# Patient Record
Sex: FEMALE | Race: WHITE | HISPANIC OR LATINO | Employment: UNEMPLOYED | URBAN - METROPOLITAN AREA
[De-identification: names, ages, dates, MRNs, and addresses within clinical notes are randomized per-mention and may not be internally consistent; named-entity substitution may affect disease eponyms.]

---

## 2024-07-23 PROBLEM — R55 SYNCOPE AND COLLAPSE: Status: ACTIVE | Noted: 2024-07-23

## 2024-07-23 PROBLEM — R94.31 ABNORMAL EKG: Status: ACTIVE | Noted: 2024-07-23

## 2024-07-23 PROBLEM — R06.09 DOE (DYSPNEA ON EXERTION): Status: ACTIVE | Noted: 2024-07-23

## 2024-07-29 ENCOUNTER — HOSPITAL ENCOUNTER (OUTPATIENT)
Dept: RADIOLOGY | Facility: HOSPITAL | Age: 39
Discharge: HOME | End: 2024-07-29
Attending: NURSE PRACTITIONER

## 2024-07-29 DIAGNOSIS — R10.2 PELVIC PAIN: ICD-10-CM

## 2024-07-29 PROCEDURE — 76856 US EXAM PELVIC COMPLETE: CPT | Mod: TC

## 2024-08-06 ENCOUNTER — HOSPITAL ENCOUNTER (OUTPATIENT)
Dept: CARDIOLOGY | Facility: HOSPITAL | Age: 39
Discharge: HOME | End: 2024-08-06

## 2024-08-06 VITALS
BODY MASS INDEX: 24.55 KG/M2 | WEIGHT: 152.75 LBS | HEIGHT: 66 IN | HEART RATE: 67 BPM | SYSTOLIC BLOOD PRESSURE: 132 MMHG | RESPIRATION RATE: 20 BRPM | DIASTOLIC BLOOD PRESSURE: 85 MMHG

## 2024-08-06 DIAGNOSIS — R00.2 PALPITATIONS: ICD-10-CM

## 2024-08-06 DIAGNOSIS — R07.89 CHEST DISCOMFORT: ICD-10-CM

## 2024-08-06 DIAGNOSIS — R06.09 DOE (DYSPNEA ON EXERTION): ICD-10-CM

## 2024-08-06 DIAGNOSIS — R94.31 ABNORMAL EKG: ICD-10-CM

## 2024-08-06 LAB
CV STRESS BASE HR: 66 BPM
DIASTOLIC BLOOD PRESSURE: 85 MMHG
OHS CV CPX 85 PERCENT MAX PREDICTED HEART RATE MALE: 154
OHS CV CPX ESTIMATED METS: 10
OHS CV CPX MAX PREDICTED HEART RATE: 181
OHS CV CPX PATIENT IS FEMALE: 1
OHS CV CPX PATIENT IS MALE: 0
OHS CV CPX PEAK DIASTOLIC BLOOD PRESSURE: 89 MMHG
OHS CV CPX PEAK HEAR RATE: 155 BPM
OHS CV CPX PEAK RATE PRESSURE PRODUCT: NORMAL
OHS CV CPX PEAK SYSTOLIC BLOOD PRESSURE: 168 MMHG
OHS CV CPX PERCENT MAX PREDICTED HEART RATE ACHIEVED: 90
OHS CV CPX RATE PRESSURE PRODUCT PRESENTING: 8712
OHS QRS DURATION: 100 MS
OHS QTC CALCULATION: 386 MS
STRESS ECHO POST EXERCISE DUR MIN: 8 MINUTES
STRESS ECHO POST EXERCISE DUR SEC: 4 SECONDS
SYSTOLIC BLOOD PRESSURE: 132 MMHG

## 2024-08-06 PROCEDURE — 93017 CV STRESS TEST TRACING ONLY: CPT

## 2024-08-14 ENCOUNTER — TELEPHONE (OUTPATIENT)
Dept: CARDIOLOGY | Facility: CLINIC | Age: 39
End: 2024-08-14

## 2024-08-14 NOTE — TELEPHONE ENCOUNTER
Left voicemail for patient regarding results of stress test.  Advised patient to notify clinic if she has any further questions or concerns, but made her aware that we will go over the details of the testing at her next clinic visit.    Sydni Calles PA-C

## 2024-10-29 ENCOUNTER — HOSPITAL ENCOUNTER (OUTPATIENT)
Dept: CARDIOLOGY | Facility: HOSPITAL | Age: 39
Discharge: HOME | End: 2024-10-29

## 2024-10-29 VITALS
DIASTOLIC BLOOD PRESSURE: 79 MMHG | SYSTOLIC BLOOD PRESSURE: 125 MMHG | WEIGHT: 152 LBS | BODY MASS INDEX: 24.43 KG/M2 | HEIGHT: 66 IN

## 2024-10-29 DIAGNOSIS — R06.09 DOE (DYSPNEA ON EXERTION): ICD-10-CM

## 2024-10-29 DIAGNOSIS — R00.2 PALPITATIONS: ICD-10-CM

## 2024-10-29 DIAGNOSIS — R94.31 ABNORMAL EKG: ICD-10-CM

## 2024-10-29 LAB
APICAL FOUR CHAMBER EJECTION FRACTION: 53 %
APICAL TWO CHAMBER EJECTION FRACTION: 54 %
AV INDEX (PROSTH): 0.7
AV MEAN GRADIENT: 3.8 MMHG
AV PEAK GRADIENT: 6.8 MMHG
AV VALVE AREA BY VELOCITY RATIO: 2.4 CM²
AV VALVE AREA: 2.2 CM²
AV VELOCITY RATIO: 0.77
BSA FOR ECHO PROCEDURE: 1.79 M2
CV ECHO LV RWT: 0.38 CM
DOP CALC AO PEAK VEL: 1.3 M/S
DOP CALC AO VTI: 28.9 CM
DOP CALC LVOT AREA: 3.1 CM2
DOP CALC LVOT DIAMETER: 2 CM
DOP CALC LVOT PEAK VEL: 1 M/S
DOP CALC LVOT STROKE VOLUME: 63.4 CM3
DOP CALC MV VTI: 31.6 CM
DOP CALCLVOT PEAK VEL VTI: 20.2 CM
E WAVE DECELERATION TIME: 254.69 MSEC
E/A RATIO: 1.81
E/E' RATIO: 5.58 M/S
ECHO LV POSTERIOR WALL: 0.9 CM (ref 0.6–1.1)
FRACTIONAL SHORTENING: 29.8 % (ref 28–44)
HR MV ECHO: 62 BPM
INTERVENTRICULAR SEPTUM: 0.8 CM (ref 0.6–1.1)
LEFT ATRIUM SIZE: 3.06 CM
LEFT INTERNAL DIMENSION IN SYSTOLE: 3.3 CM (ref 2.1–4)
LEFT VENTRICLE DIASTOLIC VOLUME INDEX: 58.69 ML/M2
LEFT VENTRICLE DIASTOLIC VOLUME: 104.46 ML
LEFT VENTRICLE END DIASTOLIC VOLUME APICAL 2 CHAMBER: 71.98 ML
LEFT VENTRICLE END DIASTOLIC VOLUME APICAL 4 CHAMBER: 91.3 ML
LEFT VENTRICLE MASS INDEX: 74.3 G/M2
LEFT VENTRICLE SYSTOLIC VOLUME INDEX: 24.8 ML/M2
LEFT VENTRICLE SYSTOLIC VOLUME: 44.06 ML
LEFT VENTRICULAR INTERNAL DIMENSION IN DIASTOLE: 4.7 CM (ref 3.5–6)
LEFT VENTRICULAR MASS: 132.3 G
LV LATERAL E/E' RATIO: 4.79 M/S
LV SEPTAL E/E' RATIO: 6.7 M/S
LVED V (TEICH): 104.46 ML
LVES V (TEICH): 44.06 ML
LVOT MG: 1.91 MMHG
LVOT MV: 0.63 CM/S
MV MEAN GRADIENT: 1 MMHG
MV PEAK A VEL: 0.37 M/S
MV PEAK E VEL: 0.67 M/S
MV PEAK GRADIENT: 3 MMHG
MV STENOSIS PRESSURE HALF TIME: 73.86 MS
MV VALVE AREA BY CONTINUITY EQUATION: 2.01 CM2
MV VALVE AREA P 1/2 METHOD: 2.98 CM2
OHS LV EJECTION FRACTION SIMPSONS BIPLANE MOD: 54 %
PISA TR MAX VEL: 1.52 M/S
RA PRESSURE ESTIMATED: 3 MMHG
RIGHT VENTRICULAR END-DIASTOLIC DIMENSION: 2.84 CM
RV TB RVSP: 5 MMHG
TDI LATERAL: 0.14 M/S
TDI SEPTAL: 0.1 M/S
TDI: 0.12 M/S
TR MAX PG: 9 MMHG
TRICUSPID ANNULAR PLANE SYSTOLIC EXCURSION: 2.4 CM
TV REST PULMONARY ARTERY PRESSURE: 12 MMHG
Z-SCORE OF LEFT VENTRICULAR DIMENSION IN END DIASTOLE: -0.46
Z-SCORE OF LEFT VENTRICULAR DIMENSION IN END SYSTOLE: 0.64

## 2024-10-29 PROCEDURE — 93306 TTE W/DOPPLER COMPLETE: CPT

## 2024-11-29 ENCOUNTER — HOSPITAL ENCOUNTER (EMERGENCY)
Facility: HOSPITAL | Age: 39
Discharge: HOME | End: 2024-11-29
Attending: INTERNAL MEDICINE

## 2024-11-29 VITALS
BODY MASS INDEX: 24.94 KG/M2 | SYSTOLIC BLOOD PRESSURE: 133 MMHG | OXYGEN SATURATION: 100 % | HEART RATE: 59 BPM | WEIGHT: 158.94 LBS | RESPIRATION RATE: 16 BRPM | HEIGHT: 67 IN | DIASTOLIC BLOOD PRESSURE: 79 MMHG | TEMPERATURE: 98 F

## 2024-11-29 DIAGNOSIS — R51.9 RIGHT-SIDED HEADACHE: Primary | ICD-10-CM

## 2024-11-29 LAB
ALBUMIN SERPL-MCNC: 4.6 G/DL (ref 3.5–5)
ALBUMIN/GLOB SERPL: 1.4 RATIO (ref 1.1–2)
ALP SERPL-CCNC: 65 UNIT/L (ref 40–150)
ALT SERPL-CCNC: 13 UNIT/L (ref 0–55)
ANION GAP SERPL CALC-SCNC: 8 MEQ/L
AST SERPL-CCNC: 22 UNIT/L (ref 5–34)
B-HCG UR QL: NEGATIVE
BASOPHILS # BLD AUTO: 0.02 X10(3)/MCL
BASOPHILS NFR BLD AUTO: 0.2 %
BILIRUB SERPL-MCNC: 0.9 MG/DL
BUN SERPL-MCNC: 12.2 MG/DL (ref 7–18.7)
CALCIUM SERPL-MCNC: 9.5 MG/DL (ref 8.4–10.2)
CHLORIDE SERPL-SCNC: 107 MMOL/L (ref 98–107)
CO2 SERPL-SCNC: 24 MMOL/L (ref 22–29)
CREAT SERPL-MCNC: 0.79 MG/DL (ref 0.55–1.02)
CREAT/UREA NIT SERPL: 15
CTP QC/QA: YES
EOSINOPHIL # BLD AUTO: 0.16 X10(3)/MCL (ref 0–0.9)
EOSINOPHIL NFR BLD AUTO: 1.8 %
ERYTHROCYTE [DISTWIDTH] IN BLOOD BY AUTOMATED COUNT: 11.3 % (ref 11.5–17)
GFR SERPLBLD CREATININE-BSD FMLA CKD-EPI: >60 ML/MIN/1.73/M2
GLOBULIN SER-MCNC: 3.4 GM/DL (ref 2.4–3.5)
GLUCOSE SERPL-MCNC: 86 MG/DL (ref 74–100)
HCT VFR BLD AUTO: 36.9 % (ref 37–47)
HGB BLD-MCNC: 12.7 G/DL (ref 12–16)
IMM GRANULOCYTES # BLD AUTO: 0.02 X10(3)/MCL (ref 0–0.04)
IMM GRANULOCYTES NFR BLD AUTO: 0.2 %
LYMPHOCYTES # BLD AUTO: 2.99 X10(3)/MCL (ref 0.6–4.6)
LYMPHOCYTES NFR BLD AUTO: 33.5 %
MCH RBC QN AUTO: 31.9 PG (ref 27–31)
MCHC RBC AUTO-ENTMCNC: 34.4 G/DL (ref 33–36)
MCV RBC AUTO: 92.7 FL (ref 80–94)
MONOCYTES # BLD AUTO: 0.54 X10(3)/MCL (ref 0.1–1.3)
MONOCYTES NFR BLD AUTO: 6 %
NEUTROPHILS # BLD AUTO: 5.2 X10(3)/MCL (ref 2.1–9.2)
NEUTROPHILS NFR BLD AUTO: 58.3 %
NRBC BLD AUTO-RTO: 0 %
PLATELET # BLD AUTO: 188 X10(3)/MCL (ref 130–400)
PMV BLD AUTO: 9.9 FL (ref 7.4–10.4)
POTASSIUM SERPL-SCNC: 3.6 MMOL/L (ref 3.5–5.1)
PROT SERPL-MCNC: 8 GM/DL (ref 6.4–8.3)
RBC # BLD AUTO: 3.98 X10(6)/MCL (ref 4.2–5.4)
SODIUM SERPL-SCNC: 139 MMOL/L (ref 136–145)
WBC # BLD AUTO: 8.93 X10(3)/MCL (ref 4.5–11.5)

## 2024-11-29 PROCEDURE — 96372 THER/PROPH/DIAG INJ SC/IM: CPT | Performed by: PHYSICIAN ASSISTANT

## 2024-11-29 PROCEDURE — 99284 EMERGENCY DEPT VISIT MOD MDM: CPT | Mod: 25

## 2024-11-29 PROCEDURE — 80053 COMPREHEN METABOLIC PANEL: CPT | Performed by: PHYSICIAN ASSISTANT

## 2024-11-29 PROCEDURE — 85025 COMPLETE CBC W/AUTO DIFF WBC: CPT | Performed by: PHYSICIAN ASSISTANT

## 2024-11-29 PROCEDURE — 81025 URINE PREGNANCY TEST: CPT | Performed by: PHYSICIAN ASSISTANT

## 2024-11-29 PROCEDURE — 63600175 PHARM REV CODE 636 W HCPCS: Performed by: PHYSICIAN ASSISTANT

## 2024-11-29 RX ORDER — KETOROLAC TROMETHAMINE 30 MG/ML
30 INJECTION, SOLUTION INTRAMUSCULAR; INTRAVENOUS
Status: COMPLETED | OUTPATIENT
Start: 2024-11-29 | End: 2024-11-29

## 2024-11-29 RX ADMIN — KETOROLAC TROMETHAMINE 30 MG: 30 INJECTION, SOLUTION INTRAMUSCULAR; INTRAVENOUS at 09:11

## 2024-11-30 NOTE — DISCHARGE INSTRUCTIONS
Mantente brayden hidratado.  Selene un seguimiento con yanez médico dentro de 2 a 3 días.  Regrese si los síntomas empeoran.    Stay well hydrated.  Follow up with your doctor within 2-3 days.  Return if symptoms worsen.

## 2024-11-30 NOTE — ED PROVIDER NOTES
Encounter Date: 11/29/2024       History     Chief Complaint   Patient presents with    Headache     Pt states she has been have a headache for 1 week. States he takes OTC medicine it helps for a little while but it comes back.      39-year-old Maldivian-speaking female presents to the emergency department complaints of right-sided headache for 1 week.  She rates her pain 6/10.  Denies other symptoms.    The history is provided by the patient. No  was used.     Review of patient's allergies indicates:  No Known Allergies  No past medical history on file.  No past surgical history on file.  Family History   Problem Relation Name Age of Onset    Hypertension Mother      Diabetes Mother      No Known Problems Father      Heart disease Sister      Hypertension Sister      Diabetes Sister      Uterine cancer Sister      No Known Problems Maternal Grandmother      No Known Problems Paternal Grandmother       Social History     Tobacco Use    Smoking status: Never    Smokeless tobacco: Never   Substance Use Topics    Alcohol use: Never    Drug use: Never     Review of Systems   Constitutional:  Negative for chills and fever.   Respiratory:  Negative for cough, chest tightness and shortness of breath.    Cardiovascular:  Negative for chest pain and palpitations.   Gastrointestinal:  Negative for abdominal pain, diarrhea, nausea and vomiting.   Neurological:  Positive for headaches (right). Negative for dizziness and numbness.       Physical Exam     Initial Vitals [11/29/24 1945]   BP Pulse Resp Temp SpO2   (!) 154/91 66 18 98.1 °F (36.7 °C) 100 %      MAP       --         Physical Exam    Nursing note and vitals reviewed.  Constitutional: She appears well-developed and well-nourished.   Eyes: EOM are normal. Pupils are equal, round, and reactive to light.   Cardiovascular:  Normal rate, regular rhythm, normal heart sounds and intact distal pulses.           Pulmonary/Chest: Breath sounds normal.    Abdominal: Abdomen is soft. Bowel sounds are normal. She exhibits no mass. There is no guarding.   Musculoskeletal:         General: Normal range of motion.     Neurological: She is alert and oriented to person, place, and time. No cranial nerve deficit. GCS score is 15. GCS eye subscore is 4. GCS verbal subscore is 5. GCS motor subscore is 6.   Skin: Skin is warm. Capillary refill takes less than 2 seconds.         ED Course   Procedures  Labs Reviewed   CBC WITH DIFFERENTIAL - Abnormal       Result Value    WBC 8.93      RBC 3.98 (*)     Hgb 12.7      Hct 36.9 (*)     MCV 92.7      MCH 31.9 (*)     MCHC 34.4      RDW 11.3 (*)     Platelet 188      MPV 9.9      Neut % 58.3      Lymph % 33.5      Mono % 6.0      Eos % 1.8      Basophil % 0.2      Lymph # 2.99      Neut # 5.20      Mono # 0.54      Eos # 0.16      Baso # 0.02      IG# 0.02      IG% 0.2      NRBC% 0.0     COMPREHENSIVE METABOLIC PANEL    Sodium 139      Potassium 3.6      Chloride 107      CO2 24      Glucose 86      Blood Urea Nitrogen 12.2      Creatinine 0.79      Calcium 9.5      Protein Total 8.0      Albumin 4.6      Globulin 3.4      Albumin/Globulin Ratio 1.4      Bilirubin Total 0.9      ALP 65      ALT 13      AST 22      eGFR >60      Anion Gap 8.0      BUN/Creatinine Ratio 15     CBC W/ AUTO DIFFERENTIAL    Narrative:     The following orders were created for panel order CBC Auto Differential.  Procedure                               Abnormality         Status                     ---------                               -----------         ------                     CBC with Differential[[id number]]       Abnormal            Final result                 Please view results for these tests on the individual orders.   POCT URINE PREGNANCY    POC Preg Test, Ur Negative       Acceptable Yes            Imaging Results    None          Medications   ketorolac injection 30 mg (30 mg Intramuscular Given 11/29/24 0963)     Medical  Decision Making  39-year-old Urdu-speaking female presents to the emergency department complaints of right-sided headache for 1 week.  She rates her pain 6/10.  Denies other symptoms.    DDx: tension headache, dehydration, migraine    Normal exam, CBC and CMP unremarkable.  Toradol IM given in the ED for headache.  She will follow up with her doctor.      Amount and/or Complexity of Data Reviewed  Labs: ordered.    Risk  Prescription drug management.               ED Course as of 11/30/24 0031 Fri Nov 29, 2024 2219 The patient is resting comfortably and in no acute distress.  She states that her symptoms have improved after treatment in Emergency Department. I personally discussed her test results and treatment plan.  Gave strict ED precautions, discussed specific conditions for return to the emergency department and importance of follow up with her primary care provider.  Patient voices understanding and agrees to the plan discussed. All patients' questions have been answered at this time.   She has remained hemodynamically stable throughout entire stay in ED and is stable for discharge home.  [ER]      ED Course User Index  [ER] Sayra Camacho PA                           Clinical Impression:  Final diagnoses:  [R51.9] Right-sided headache (Primary)          ED Disposition Condition    Discharge Stable          ED Prescriptions    None       Follow-up Information       Follow up With Specialties Details Why Contact Info    [facility] - Emergency Dept Emergency Medicine  As needed, If symptoms worsen [address] [facility] [address] [phone]             Sayra Camacho PA  11/30/24 0031

## 2025-01-13 ENCOUNTER — HOSPITAL ENCOUNTER (EMERGENCY)
Facility: HOSPITAL | Age: 40
Discharge: HOME | End: 2025-01-13
Attending: EMERGENCY MEDICINE

## 2025-01-13 VITALS
HEART RATE: 62 BPM | OXYGEN SATURATION: 100 % | HEIGHT: 67 IN | TEMPERATURE: 98 F | DIASTOLIC BLOOD PRESSURE: 75 MMHG | WEIGHT: 158.5 LBS | SYSTOLIC BLOOD PRESSURE: 118 MMHG | BODY MASS INDEX: 24.88 KG/M2 | RESPIRATION RATE: 18 BRPM

## 2025-01-13 DIAGNOSIS — G43.809 OTHER MIGRAINE WITHOUT STATUS MIGRAINOSUS, NOT INTRACTABLE: Primary | ICD-10-CM

## 2025-01-13 LAB
B-HCG UR QL: NEGATIVE
CTP QC/QA: YES

## 2025-01-13 PROCEDURE — 96372 THER/PROPH/DIAG INJ SC/IM: CPT | Performed by: EMERGENCY MEDICINE

## 2025-01-13 PROCEDURE — 63600175 PHARM REV CODE 636 W HCPCS: Performed by: EMERGENCY MEDICINE

## 2025-01-13 PROCEDURE — 99284 EMERGENCY DEPT VISIT MOD MDM: CPT | Mod: 25

## 2025-01-13 PROCEDURE — 81025 URINE PREGNANCY TEST: CPT | Performed by: EMERGENCY MEDICINE

## 2025-01-13 PROCEDURE — 25000003 PHARM REV CODE 250: Performed by: EMERGENCY MEDICINE

## 2025-01-13 RX ORDER — DEXAMETHASONE SODIUM PHOSPHATE 4 MG/ML
8 INJECTION, SOLUTION INTRA-ARTICULAR; INTRALESIONAL; INTRAMUSCULAR; INTRAVENOUS; SOFT TISSUE
Status: COMPLETED | OUTPATIENT
Start: 2025-01-13 | End: 2025-01-13

## 2025-01-13 RX ORDER — BUTALBITAL, ACETAMINOPHEN AND CAFFEINE 50; 325; 40 MG/1; MG/1; MG/1
1 TABLET ORAL
Status: COMPLETED | OUTPATIENT
Start: 2025-01-13 | End: 2025-01-13

## 2025-01-13 RX ORDER — BUTALBITAL, ACETAMINOPHEN AND CAFFEINE 50; 325; 40 MG/1; MG/1; MG/1
2 TABLET ORAL 3 TIMES DAILY PRN
Qty: 30 TABLET | Refills: 3 | Status: SHIPPED | OUTPATIENT
Start: 2025-01-13

## 2025-01-13 RX ORDER — METOCLOPRAMIDE HYDROCHLORIDE 5 MG/ML
10 INJECTION INTRAMUSCULAR; INTRAVENOUS
Status: COMPLETED | OUTPATIENT
Start: 2025-01-13 | End: 2025-01-13

## 2025-01-13 RX ORDER — KETOROLAC TROMETHAMINE 30 MG/ML
30 INJECTION, SOLUTION INTRAMUSCULAR; INTRAVENOUS
Status: COMPLETED | OUTPATIENT
Start: 2025-01-13 | End: 2025-01-13

## 2025-01-13 RX ADMIN — DEXAMETHASONE SODIUM PHOSPHATE 8 MG: 4 INJECTION, SOLUTION INTRA-ARTICULAR; INTRALESIONAL; INTRAMUSCULAR; INTRAVENOUS; SOFT TISSUE at 09:01

## 2025-01-13 RX ADMIN — METOCLOPRAMIDE HYDROCHLORIDE 10 MG: 5 INJECTION INTRAMUSCULAR; INTRAVENOUS at 09:01

## 2025-01-13 RX ADMIN — BUTALBITAL, ACETAMINOPHEN, AND CAFFEINE 1 TABLET: 325; 50; 40 TABLET ORAL at 08:01

## 2025-01-13 RX ADMIN — KETOROLAC TROMETHAMINE 30 MG: 30 INJECTION, SOLUTION INTRAMUSCULAR; INTRAVENOUS at 09:01

## 2025-01-13 NOTE — ED PROVIDER NOTES
ED PROVIDER NOTE  1/13/2025    CHIEF COMPLAINT:   Chief Complaint   Patient presents with    Headache     Headache since yesterday. Denies any head injury or vommiting. Reports had an upcoming appointment with neurologist this month for migraines.        HISTORY OF PRESENT ILLNESS:   Lisa Lyon is a 39 y.o. female who presents with chief complaint Headache.  Onset was yesterday whenever she began having headache that has mild-to-moderate in severity.  States she normally would take Fioricet whenever headache would come on but states she is out of this medication.  This is NOT the worst headache of her life.  Denies any concern for pregnancy.    The history is provided by the patient. The history is limited by a language barrier. A  was used.         REVIEW OF SYSTEMS: as noted in the HPI.  NURSING NOTES REVIEWED      PAST MEDICAL/SURGICAL HISTORY: History reviewed. No pertinent past medical history. History reviewed. No pertinent surgical history.    FAMILY HISTORY:   Family History   Problem Relation Name Age of Onset    Hypertension Mother      Diabetes Mother      No Known Problems Father      Heart disease Sister      Hypertension Sister      Diabetes Sister      Uterine cancer Sister      No Known Problems Maternal Grandmother      No Known Problems Paternal Grandmother         SOCIAL HISTORY:   Social History     Tobacco Use    Smoking status: Never    Smokeless tobacco: Never   Substance Use Topics    Alcohol use: Never    Drug use: Never       ALLERGIES: Review of patient's allergies indicates:  No Known Allergies    PHYSICAL EXAM:  Initial Vitals [01/13/25 0816]   BP Pulse Resp Temp SpO2   (!) 145/84 78 18 98.3 °F (36.8 °C) 99 %      MAP       --         Physical Exam    Nursing note and vitals reviewed.  Constitutional: She appears well-developed and well-nourished.   HENT:   Head: Normocephalic and atraumatic. Mouth/Throat: Uvula is midline and mucous membranes are  normal.   Eyes: EOM are normal. Pupils are equal, round, and reactive to light.   Neck: Trachea normal. Neck supple.   Cardiovascular:  Normal rate, regular rhythm and normal pulses.           Pulmonary/Chest: Effort normal and breath sounds normal.   Abdominal: Abdomen is soft. Bowel sounds are normal. There is no abdominal tenderness. There is no rebound and no guarding.   Musculoskeletal:         General: Normal range of motion.      Cervical back: Neck supple.     Neurological: She is alert and oriented to person, place, and time. GCS eye subscore is 4. GCS verbal subscore is 5. GCS motor subscore is 6.   Skin: Skin is warm and dry.   Psychiatric: She has a normal mood and affect. Her speech is normal. Thought content normal.         RESULTS:  Labs Reviewed   POCT URINE PREGNANCY       Result Value    POC Preg Test, Ur Negative       Acceptable Yes       Imaging Results    None         PROCEDURES:  Procedures    ECG:       ED COURSE AND MEDICAL DECISION MAKING:  Medications   butalbital-acetaminophen-caffeine -40 mg per tablet 1 tablet (1 tablet Oral Given 1/13/25 0835)   ketorolac injection 30 mg (30 mg Intramuscular Given 1/13/25 0953)   metoclopramide injection 10 mg (10 mg Intramuscular Given 1/13/25 0953)   dexAMETHasone injection 8 mg (8 mg Intramuscular Given 1/13/25 0953)     ED Course as of 01/13/25 1901   Mon Jan 13, 2025   0921 Reports no significant improvement with Fioricet.  We will give migraine cocktail of IM Reglan Toradol and Decadron. [IB]   1024 hCG Qualitative, Urine: Negative [IB]   1045 Reports improvement in headache after migraine cocktail. [IB]      ED Course User Index  [IB] Rick Fermin, DO        Medical Decision Making  39-year-old female who presents with headache that began yesterday.  States headache is typical of migraines she has had in the past.  Reports improvement in the headache after migraine cocktail.  We will discharge with Fioricet.  Given strict  ED return precautions. I have spoken with the patient and/or caregivers. I have explained the patient's condition, diagnoses and treatment plan based on the information available to me at this time. I have answered the patient's and/or caregiver's questions and addressed any concerns. The patient and/or caregivers have as good an understanding of the patient's diagnosis, condition and treatment plan as can be expected at this point. The vital signs have been stable. The patient's condition is stable and appropriate for discharge from the emergency department.     The patient will pursue further outpatient evaluation with the primary care physician or other designated or consulting physician as outlined in the discharge instructions. The patient and/or caregivers are agreeable to this plan of care and follow-up instructions have been explained in detail. The patient and/or caregivers have received these instructions in written format and have expressed an understanding of the discharge instructions. The patient and/or caregivers are aware that any significant change in condition or worsening of symptoms should prompt an immediate return to this or the closest emergency department or a call to 911.    Amount and/or Complexity of Data Reviewed  External Data Reviewed: notes.  Labs: ordered. Decision-making details documented in ED Course.    Risk  OTC drugs.  Prescription drug management.        CLINICAL IMPRESSION:  1. Other migraine without status migrainosus, not intractable        DISPOSITION:   ED Disposition Condition    Discharge Stable            ED Prescriptions       Medication Sig Dispense Start Date End Date Auth. Provider    butalbital-acetaminophen-caffeine -40 mg (FIORICET, ESGIC) -40 mg per tablet Take 2 tablets by mouth 3 (three) times daily as needed for Headaches. 30 tablet 1/13/2025 -- Rick Fermin, DO          Follow-up Information       Follow up With Specialties Details Why Contact  Info    [facility] - Emergency Dept Emergency Medicine  If symptoms worsen [address] [facility] [address]  [phone]               Rick Fermin DO  01/13/25 1909

## 2025-03-18 ENCOUNTER — HOSPITAL ENCOUNTER (EMERGENCY)
Facility: HOSPITAL | Age: 40
Discharge: HOME | End: 2025-03-18
Attending: EMERGENCY MEDICINE

## 2025-03-18 VITALS
BODY MASS INDEX: 24.43 KG/M2 | OXYGEN SATURATION: 98 % | RESPIRATION RATE: 18 BRPM | WEIGHT: 156 LBS | SYSTOLIC BLOOD PRESSURE: 116 MMHG | HEART RATE: 68 BPM | DIASTOLIC BLOOD PRESSURE: 74 MMHG | TEMPERATURE: 99 F

## 2025-03-18 DIAGNOSIS — B34.9 VIRAL SYNDROME: Primary | ICD-10-CM

## 2025-03-18 DIAGNOSIS — R06.02 SOB (SHORTNESS OF BREATH): ICD-10-CM

## 2025-03-18 DIAGNOSIS — Z13.9 SCREENING DUE: ICD-10-CM

## 2025-03-18 LAB
ALBUMIN SERPL-MCNC: 4.2 G/DL (ref 3.5–5)
ALBUMIN/GLOB SERPL: 1.3 RATIO (ref 1.1–2)
ALP SERPL-CCNC: 50 UNIT/L (ref 40–150)
ALT SERPL-CCNC: 14 UNIT/L (ref 0–55)
ANION GAP SERPL CALC-SCNC: 6 MEQ/L
AST SERPL-CCNC: 18 UNIT/L (ref 5–34)
B-HCG UR QL: NEGATIVE
BACTERIA #/AREA URNS AUTO: ABNORMAL /HPF
BASOPHILS # BLD AUTO: 0.02 X10(3)/MCL
BASOPHILS NFR BLD AUTO: 0.3 %
BILIRUB SERPL-MCNC: 1.3 MG/DL
BILIRUB UR QL STRIP.AUTO: NEGATIVE
BUN SERPL-MCNC: 6.9 MG/DL (ref 7–18.7)
CALCIUM SERPL-MCNC: 8.7 MG/DL (ref 8.4–10.2)
CHLORIDE SERPL-SCNC: 109 MMOL/L (ref 98–107)
CLARITY UR: CLEAR
CO2 SERPL-SCNC: 24 MMOL/L (ref 22–29)
COLOR UR AUTO: COLORLESS
CREAT SERPL-MCNC: 0.68 MG/DL (ref 0.55–1.02)
CREAT/UREA NIT SERPL: 10
CTP QC/QA: YES
EOSINOPHIL # BLD AUTO: 0.09 X10(3)/MCL (ref 0–0.9)
EOSINOPHIL NFR BLD AUTO: 1.3 %
ERYTHROCYTE [DISTWIDTH] IN BLOOD BY AUTOMATED COUNT: 11.4 % (ref 11.5–17)
FLUAV AG UPPER RESP QL IA.RAPID: NOT DETECTED
FLUBV AG UPPER RESP QL IA.RAPID: NOT DETECTED
GFR SERPLBLD CREATININE-BSD FMLA CKD-EPI: >60 ML/MIN/1.73/M2
GLOBULIN SER-MCNC: 3.2 GM/DL (ref 2.4–3.5)
GLUCOSE SERPL-MCNC: 93 MG/DL (ref 74–100)
GLUCOSE UR QL STRIP: NORMAL
HCT VFR BLD AUTO: 36.4 % (ref 37–47)
HGB BLD-MCNC: 12.7 G/DL (ref 12–16)
HGB UR QL STRIP: NEGATIVE
HOLD SPECIMEN: NORMAL
HYALINE CASTS #/AREA URNS LPF: ABNORMAL /LPF
IMM GRANULOCYTES # BLD AUTO: 0.02 X10(3)/MCL (ref 0–0.04)
IMM GRANULOCYTES NFR BLD AUTO: 0.3 %
KETONES UR QL STRIP: NEGATIVE
LEUKOCYTE ESTERASE UR QL STRIP: 25
LYMPHOCYTES # BLD AUTO: 1.75 X10(3)/MCL (ref 0.6–4.6)
LYMPHOCYTES NFR BLD AUTO: 24.8 %
MCH RBC QN AUTO: 32.4 PG (ref 27–31)
MCHC RBC AUTO-ENTMCNC: 34.9 G/DL (ref 33–36)
MCV RBC AUTO: 92.9 FL (ref 80–94)
MONOCYTES # BLD AUTO: 0.52 X10(3)/MCL (ref 0.1–1.3)
MONOCYTES NFR BLD AUTO: 7.4 %
NEUTROPHILS # BLD AUTO: 4.65 X10(3)/MCL (ref 2.1–9.2)
NEUTROPHILS NFR BLD AUTO: 65.9 %
NITRITE UR QL STRIP: NEGATIVE
NRBC BLD AUTO-RTO: 0 %
PH UR STRIP: 6.5 [PH]
PLATELET # BLD AUTO: 193 X10(3)/MCL (ref 130–400)
PMV BLD AUTO: 9.8 FL (ref 7.4–10.4)
POTASSIUM SERPL-SCNC: 3.6 MMOL/L (ref 3.5–5.1)
PROT SERPL-MCNC: 7.4 GM/DL (ref 6.4–8.3)
PROT UR QL STRIP: NEGATIVE
RBC # BLD AUTO: 3.92 X10(6)/MCL (ref 4.2–5.4)
RBC #/AREA URNS AUTO: ABNORMAL /HPF
SARS-COV-2 RNA RESP QL NAA+PROBE: NOT DETECTED
SODIUM SERPL-SCNC: 139 MMOL/L (ref 136–145)
SP GR UR STRIP.AUTO: 1 (ref 1–1.03)
SQUAMOUS #/AREA URNS LPF: ABNORMAL /HPF
UROBILINOGEN UR STRIP-ACNC: NORMAL
WBC # BLD AUTO: 7.05 X10(3)/MCL (ref 4.5–11.5)
WBC #/AREA URNS AUTO: ABNORMAL /HPF

## 2025-03-18 PROCEDURE — 96374 THER/PROPH/DIAG INJ IV PUSH: CPT

## 2025-03-18 PROCEDURE — 96375 TX/PRO/DX INJ NEW DRUG ADDON: CPT

## 2025-03-18 PROCEDURE — 81015 MICROSCOPIC EXAM OF URINE: CPT | Performed by: EMERGENCY MEDICINE

## 2025-03-18 PROCEDURE — 81025 URINE PREGNANCY TEST: CPT | Performed by: EMERGENCY MEDICINE

## 2025-03-18 PROCEDURE — 99285 EMERGENCY DEPT VISIT HI MDM: CPT | Mod: 25

## 2025-03-18 PROCEDURE — 25000003 PHARM REV CODE 250: Performed by: EMERGENCY MEDICINE

## 2025-03-18 PROCEDURE — 96361 HYDRATE IV INFUSION ADD-ON: CPT

## 2025-03-18 PROCEDURE — 93005 ELECTROCARDIOGRAM TRACING: CPT

## 2025-03-18 PROCEDURE — 63600175 PHARM REV CODE 636 W HCPCS: Mod: JZ,TB | Performed by: EMERGENCY MEDICINE

## 2025-03-18 PROCEDURE — 0240U COVID/FLU A&B PCR: CPT | Performed by: EMERGENCY MEDICINE

## 2025-03-18 PROCEDURE — 80053 COMPREHEN METABOLIC PANEL: CPT | Performed by: EMERGENCY MEDICINE

## 2025-03-18 PROCEDURE — 85025 COMPLETE CBC W/AUTO DIFF WBC: CPT | Performed by: EMERGENCY MEDICINE

## 2025-03-18 RX ORDER — DIPHENHYDRAMINE HYDROCHLORIDE 50 MG/ML
25 INJECTION, SOLUTION INTRAMUSCULAR; INTRAVENOUS
Status: COMPLETED | OUTPATIENT
Start: 2025-03-18 | End: 2025-03-18

## 2025-03-18 RX ORDER — KETOROLAC TROMETHAMINE 30 MG/ML
30 INJECTION, SOLUTION INTRAMUSCULAR; INTRAVENOUS
Status: COMPLETED | OUTPATIENT
Start: 2025-03-18 | End: 2025-03-18

## 2025-03-18 RX ADMIN — SODIUM CHLORIDE 1000 ML: 9 INJECTION, SOLUTION INTRAVENOUS at 10:03

## 2025-03-18 RX ADMIN — KETOROLAC TROMETHAMINE 30 MG: 30 INJECTION, SOLUTION INTRAMUSCULAR; INTRAVENOUS at 10:03

## 2025-03-18 RX ADMIN — DIPHENHYDRAMINE HYDROCHLORIDE 25 MG: 50 INJECTION INTRAMUSCULAR; INTRAVENOUS at 10:03

## 2025-03-18 NOTE — ED PROVIDER NOTES
Encounter Date: 3/18/2025       History     Chief Complaint   Patient presents with    Headache     PT W CO HA W NAUSEA X 4 DAYS.  DENIES VISION CHANGES, NO LIGHT SEN. STATES SOB AND HAS TO TAKE DEEP BREATHS AT TIMES ALONG W INTERMITTENT PLEURITIC CP.  DENIES COUGH, NO FEVER.  EKG OBTAINED.        Patient is here endorsing headache, similar to previous episodes in the past for which no working diagnosis established on objective grounds.  Patient is wondering whether perhaps has COVID or flu given chills, aches, family members with similar symptoms.        Review of patient's allergies indicates:  No Known Allergies  History reviewed. No pertinent past medical history.  History reviewed. No pertinent surgical history.  Family History   Problem Relation Name Age of Onset    Hypertension Mother      Diabetes Mother      No Known Problems Father      Heart disease Sister      Hypertension Sister      Diabetes Sister      Uterine cancer Sister      No Known Problems Maternal Grandmother      No Known Problems Paternal Grandmother       Social History[1]  Review of Systems    Physical Exam     Initial Vitals [03/18/25 0941]   BP Pulse Resp Temp SpO2   116/74 68 18 99.3 °F (37.4 °C) 98 %      MAP       --         Physical Exam    Nursing note and vitals reviewed.  Constitutional: She appears well-developed and well-nourished. She is not diaphoretic. No distress.   HENT:   Head: Normocephalic and atraumatic.   Right Ear: External ear normal.   Left Ear: External ear normal.   Eyes: EOM are normal. Pupils are equal, round, and reactive to light. Right eye exhibits no discharge. Left eye exhibits no discharge.   Neck: Neck supple. No thyromegaly present. No tracheal deviation present. No JVD present.   Normal range of motion.  Cardiovascular:  Normal rate, regular rhythm, normal heart sounds and intact distal pulses.     Exam reveals no gallop and no friction rub.       No murmur heard.  Pulmonary/Chest: Breath sounds normal.  No stridor. No respiratory distress. She has no wheezes. She has no rhonchi. She has no rales.   Abdominal: Abdomen is soft. Bowel sounds are normal. She exhibits no distension. There is no abdominal tenderness. There is no rebound and no guarding.   Musculoskeletal:         General: No tenderness or edema. Normal range of motion.      Cervical back: Normal range of motion and neck supple.     Neurological: She is alert and oriented to person, place, and time. She has normal strength. No cranial nerve deficit or sensory deficit. GCS score is 15. GCS eye subscore is 4. GCS verbal subscore is 5. GCS motor subscore is 6.   Skin: Skin is warm and dry. Capillary refill takes less than 2 seconds. No rash and no abscess noted. No erythema. No pallor.   Psychiatric: She has a normal mood and affect. Her behavior is normal. Judgment and thought content normal.         ED Course   Procedures  Labs Reviewed   COMPREHENSIVE METABOLIC PANEL - Abnormal       Result Value    Sodium 139      Potassium 3.6      Chloride 109 (*)     CO2 24      Glucose 93      Blood Urea Nitrogen 6.9 (*)     Creatinine 0.68      Calcium 8.7      Protein Total 7.4      Albumin 4.2      Globulin 3.2      Albumin/Globulin Ratio 1.3      Bilirubin Total 1.3      ALP 50      ALT 14      AST 18      eGFR >60      Anion Gap 6.0      BUN/Creatinine Ratio 10     URINALYSIS, REFLEX TO URINE CULTURE - Abnormal    Color, UA Colorless (*)     Appearance, UA Clear      Specific Gravity, UA 1.005      pH, UA 6.5      Protein, UA Negative      Glucose, UA Normal      Ketones, UA Negative      Blood, UA Negative      Bilirubin, UA Negative      Urobilinogen, UA Normal      Nitrites, UA Negative      Leukocyte Esterase, UA 25 (*)     RBC, UA 0-5      WBC, UA 0-5      Bacteria, UA Many (*)     Squamous Epithelial Cells, UA Occasional (*)     Hyaline Casts, UA None Seen     CBC WITH DIFFERENTIAL - Abnormal    WBC 7.05      RBC 3.92 (*)     Hgb 12.7      Hct 36.4 (*)      MCV 92.9      MCH 32.4 (*)     MCHC 34.9      RDW 11.4 (*)     Platelet 193      MPV 9.8      Neut % 65.9      Lymph % 24.8      Mono % 7.4      Eos % 1.3      Basophil % 0.3      Imm Grans % 0.3      Neut # 4.65      Lymph # 1.75      Mono # 0.52      Eos # 0.09      Baso # 0.02      Imm Gran # 0.02      NRBC% 0.0     COVID/FLU A&B PCR - Normal    Influenza A PCR Not Detected      Influenza B PCR Not Detected      SARS-CoV-2 PCR Not Detected      Narrative:     The Xpert Xpress SARS-CoV-2/FLU/RSV plus is a rapid, multiplexed real-time PCR test intended for the simultaneous qualitative detection and differentiation of SARS-CoV-2, Influenza A, Influenza B, and respiratory syncytial virus (RSV) viral RNA in either nasopharyngeal swab or nasal swab specimens.         CBC W/ AUTO DIFFERENTIAL    Narrative:     The following orders were created for panel order CBC auto differential.  Procedure                               Abnormality         Status                     ---------                               -----------         ------                     CBC with Differential[[id number]]       Abnormal            Final result                 Please view results for these tests on the individual orders.   EXTRA TUBES    Narrative:     The following orders were created for panel order EXTRA TUBES.  Procedure                               Abnormality         Status                     ---------                               -----------         ------                     Light Blue Top Hold[[id number]]                             Final result               Gold Top Hold[[id number]]                                   Final result               Pink Top Hold[[id number]]                                   Final result                 Please view results for these tests on the individual orders.   LIGHT BLUE TOP HOLD    Extra Tube Hold for add-ons.     GOLD TOP HOLD    Extra Tube Hold for add-ons.     PINK TOP HOLD    Extra Tube  Hold for add-ons.     POCT URINE PREGNANCY    POC Preg Test, Ur Negative       Acceptable Yes          ECG Results              EKG 12-lead (Shortness of Breath) Age > 50 (In process)        Collection Time Result Time QRS Duration [facility] QTC Calculation    03/18/25 09:56:45 03/18/25 10:02:43 88 384                     In process by Interface, Lab In [facility] (03/18/25 10:02:47)                   Narrative:    Test Reason : R06.02,    Vent. Rate :  63 BPM     Atrial Rate :  63 BPM     P-R Int : 156 ms          QRS Dur :  88 ms      QT Int : 376 ms       P-R-T Axes :  80  61  36 degrees    QTcB Int : 384 ms    Normal sinus rhythm with sinus arrhythmia  Normal ECG  When compared with ECG of 06-Aug-2024 13:04,  Borderline criteria for Inferior infarct are no longer Present    Referred By:            Confirmed By:                                   Imaging Results              X-Ray Chest AP Portable (Final result)  Result time 03/18/25 10:46:30      Final result by Vito Ham MD (03/18/25 10:46:30)                   Impression:      No acute cardiopulmonary process identified.      Electronically signed by: Vito Ham  Date:    03/18/2025  Time:    10:46               Narrative:    EXAMINATION:  XR CHEST AP PORTABLE    CLINICAL HISTORY:  Encounter for screening, unspecified.  Shortness of breath.    TECHNIQUE:  One view    COMPARISON:  June 6, 2024.    FINDINGS:  Cardiopericardial silhouette is within normal limits.  No acute dense focal or segmental consolidation, congestive process, pleural effusions or pneumothorax.                                       Medications   sodium chloride 0.9% bolus 1,000 mL 1,000 mL (0 mLs Intravenous Stopped 3/18/25 1051)   sodium chloride 0.9% bolus 1,000 mL 1,000 mL (0 mLs Intravenous Stopped 3/18/25 1151)   ketorolac injection 30 mg (30 mg Intravenous Given 3/18/25 1055)   diphenhydrAMINE injection 25 mg (25 mg Intravenous Given 3/18/25 1055)     Medical Decision  Making  Amount and/or Complexity of Data Reviewed  External Data Reviewed: notes.  Labs: ordered. Decision-making details documented in ED Course.  Radiology: ordered.    Risk  Prescription drug management.  Risk Details: Features found most compatible with viral syndrome.  Risk found sufficient to warrant expanded evaluation with objective data.  The data resulted in found reassuring.  Patient is improved with conservative interventions.  She is discharged in stable condition with anticipatory guidance, return precautions, follow up instructions.  Home in stable condition without event.               ED Course as of 03/18/25 1248   Tue Mar 18, 2025   1050 Negative urine pregnancy test; [CT]   1050 Reassuring hemogram; [CT]   1155 Respiratory pathogens negative by PCR; [CT]   1155 Reassuring hemogram; [CT]   1155 Reassuring chemistries; [CT]      ED Course User Index  [CT] Garrett Trejo MD                           Clinical Impression:  Final diagnoses:  [R06.02] SOB (shortness of breath)  [Z13.9] Screening due  [B34.9] Viral syndrome (Primary)          ED Disposition Condition    Discharge Stable          ED Prescriptions    None       Follow-up Information       Follow up With Specialties Details Why Contact Info    [facility] - Emergency Dept Emergency Medicine  As needed, If symptoms worsen [address] [facility] [address] [phone]               [1]   Social History  Tobacco Use    Smoking status: Never    Smokeless tobacco: Never   Substance Use Topics    Alcohol use: Never    Drug use: Never        Garrett Trejo MD  03/18/25 2439

## 2025-03-19 LAB
OHS QRS DURATION: 88 MS
OHS QTC CALCULATION: 384 MS

## 2025-07-28 ENCOUNTER — TELEPHONE (OUTPATIENT)
Dept: NEUROLOGY | Facility: CLINIC | Age: 40
End: 2025-07-28